# Patient Record
Sex: FEMALE
[De-identification: names, ages, dates, MRNs, and addresses within clinical notes are randomized per-mention and may not be internally consistent; named-entity substitution may affect disease eponyms.]

---

## 2020-08-20 ENCOUNTER — HOSPITAL ENCOUNTER (INPATIENT)
Dept: HOSPITAL 90 - EDH | Age: 55
LOS: 2 days | Discharge: HOME | DRG: 377 | End: 2020-08-22
Attending: INTERNAL MEDICINE | Admitting: INTERNAL MEDICINE
Payer: MEDICARE

## 2020-08-20 DIAGNOSIS — F41.9: ICD-10-CM

## 2020-08-20 DIAGNOSIS — N18.6: ICD-10-CM

## 2020-08-20 DIAGNOSIS — Z99.81: ICD-10-CM

## 2020-08-20 DIAGNOSIS — K92.2: Primary | ICD-10-CM

## 2020-08-20 DIAGNOSIS — I50.42: ICD-10-CM

## 2020-08-20 DIAGNOSIS — I95.89: ICD-10-CM

## 2020-08-20 DIAGNOSIS — Z99.2: ICD-10-CM

## 2020-08-20 DIAGNOSIS — E87.5: ICD-10-CM

## 2020-08-20 DIAGNOSIS — E87.1: ICD-10-CM

## 2020-08-20 DIAGNOSIS — J45.909: ICD-10-CM

## 2020-08-20 DIAGNOSIS — U07.1: ICD-10-CM

## 2020-08-20 DIAGNOSIS — D64.9: ICD-10-CM

## 2020-08-20 DIAGNOSIS — R53.81: ICD-10-CM

## 2020-08-20 LAB
ALBUMIN SERPL-MCNC: 1.8 G/DL (ref 3.5–5)
ALT SERPL-CCNC: 23 U/L (ref 12–78)
AST SERPL-CCNC: 55 U/L (ref 10–37)
BASOPHILS NFR BLD AUTO: 0.3 % (ref 0–5)
BASOPHILS NFR BLD AUTO: 0.3 % (ref 0–5)
BILIRUB SERPL-MCNC: 1.2 MG/DL (ref 0.2–1)
BNP SERPL-MCNC: 2430 PG/ML (ref 0–100)
BUN SERPL-MCNC: 67 MG/DL (ref 7–18)
CHLORIDE SERPL-SCNC: 90 MMOL/L (ref 101–111)
CO2 SERPL-SCNC: 23 MMOL/L (ref 21–32)
CREAT SERPL-MCNC: 5.3 MG/DL (ref 0.5–1.5)
EOSINOPHIL NFR BLD AUTO: 0.1 % (ref 0–8)
EOSINOPHIL NFR BLD AUTO: 0.2 % (ref 0–8)
ERYTHROCYTE [DISTWIDTH] IN BLOOD BY AUTOMATED COUNT: 21.9 % (ref 11–15.5)
GFR SERPL CREATININE-BSD FRML MDRD: 9 ML/MIN (ref 60–?)
GLUCOSE SERPL-MCNC: 239 MG/DL (ref 70–105)
HBA1C MFR BLD: 5.7 % (ref 4–6)
HCT VFR BLD AUTO: 15.7 % (ref 36–48)
HCT VFR BLD AUTO: 20.1 % (ref 36–48)
HCT VFR BLD AUTO: 23.3 % (ref 36–48)
LYMPHOCYTES NFR SPEC AUTO: 5.2 % (ref 21–51)
LYMPHOCYTES NFR SPEC AUTO: 5.6 % (ref 21–51)
MCH RBC QN AUTO: 33 PG (ref 27–33)
MCH RBC QN AUTO: 34.8 PG (ref 27–33)
MCHC RBC AUTO-ENTMCNC: 30.6 G/DL (ref 32–36)
MCHC RBC AUTO-ENTMCNC: 34.3 G/DL (ref 32–36)
MCV RBC AUTO: 113.8 FL (ref 79–99)
MCV RBC AUTO: 96.2 FL (ref 79–99)
MONOCYTES NFR BLD AUTO: 1.1 % (ref 3–13)
MONOCYTES NFR BLD AUTO: 3.8 % (ref 3–13)
NEUTROPHILS NFR BLD AUTO: 90 % (ref 40–77)
NEUTROPHILS NFR BLD AUTO: 92.6 % (ref 40–77)
NRBC BLD MANUAL-RTO: 0.3 % (ref 0–0.19)
NRBC BLD MANUAL-RTO: 0.7 % (ref 0–0.19)
PLATELET # BLD AUTO: 107 K/UL (ref 130–400)
PLATELET # BLD AUTO: 185 K/UL (ref 130–400)
POTASSIUM SERPL-SCNC: 6.8 MMOL/L (ref 3.5–5.1)
PROT SERPL-MCNC: 6 G/DL (ref 6–8.3)
RBC # BLD AUTO: 1.38 MIL/UL (ref 4–5.5)
RBC # BLD AUTO: 2.09 MIL/UL (ref 4–5.5)
RBC MORPH BLD: (no result)
SODIUM SERPL-SCNC: 129 MMOL/L (ref 136–145)
WBC # BLD AUTO: 6.1 K/UL (ref 4.8–10.8)
WBC # BLD AUTO: 8.8 K/UL (ref 4.8–10.8)

## 2020-08-20 PROCEDURE — 85014 HEMATOCRIT: CPT

## 2020-08-20 PROCEDURE — 36430 TRANSFUSION BLD/BLD COMPNT: CPT

## 2020-08-20 PROCEDURE — 85025 COMPLETE CBC W/AUTO DIFF WBC: CPT

## 2020-08-20 PROCEDURE — 93005 ELECTROCARDIOGRAM TRACING: CPT

## 2020-08-20 PROCEDURE — 86850 RBC ANTIBODY SCREEN: CPT

## 2020-08-20 PROCEDURE — 99291 CRITICAL CARE FIRST HOUR: CPT

## 2020-08-20 PROCEDURE — 80053 COMPREHEN METABOLIC PANEL: CPT

## 2020-08-20 PROCEDURE — 30233N1 TRANSFUSION OF NONAUTOLOGOUS RED BLOOD CELLS INTO PERIPHERAL VEIN, PERCUTANEOUS APPROACH: ICD-10-PCS | Performed by: INTERNAL MEDICINE

## 2020-08-20 PROCEDURE — 85027 COMPLETE CBC AUTOMATED: CPT

## 2020-08-20 PROCEDURE — 86901 BLOOD TYPING SEROLOGIC RH(D): CPT

## 2020-08-20 PROCEDURE — 83880 ASSAY OF NATRIURETIC PEPTIDE: CPT

## 2020-08-20 PROCEDURE — 82270 OCCULT BLOOD FECES: CPT

## 2020-08-20 PROCEDURE — 71045 X-RAY EXAM CHEST 1 VIEW: CPT

## 2020-08-20 PROCEDURE — 82948 REAGENT STRIP/BLOOD GLUCOSE: CPT

## 2020-08-20 PROCEDURE — 86923 COMPATIBILITY TEST ELECTRIC: CPT

## 2020-08-20 PROCEDURE — 36415 COLL VENOUS BLD VENIPUNCTURE: CPT

## 2020-08-20 PROCEDURE — 86900 BLOOD TYPING SEROLOGIC ABO: CPT

## 2020-08-20 PROCEDURE — 83036 HEMOGLOBIN GLYCOSYLATED A1C: CPT

## 2020-08-20 PROCEDURE — 85018 HEMOGLOBIN: CPT

## 2020-08-20 PROCEDURE — 5A1D70Z PERFORMANCE OF URINARY FILTRATION, INTERMITTENT, LESS THAN 6 HOURS PER DAY: ICD-10-PCS | Performed by: INTERNAL MEDICINE

## 2020-08-20 PROCEDURE — 87426 SARSCOV CORONAVIRUS AG IA: CPT

## 2020-08-20 PROCEDURE — 85610 PROTHROMBIN TIME: CPT

## 2020-08-20 PROCEDURE — 90935 HEMODIALYSIS ONE EVALUATION: CPT

## 2020-08-20 PROCEDURE — 80048 BASIC METABOLIC PNL TOTAL CA: CPT

## 2020-08-20 RX ADMIN — FAMOTIDINE SCH MG: 20 TABLET, FILM COATED ORAL at 21:10

## 2020-08-20 RX ADMIN — SODIUM CHLORIDE SCH UNIT: 9 INJECTION, SOLUTION INTRAVENOUS at 21:00

## 2020-08-20 RX ADMIN — SODIUM CHLORIDE SCH UNIT: 9 INJECTION, SOLUTION INTRAVENOUS at 16:30

## 2020-08-20 NOTE — NUR
Patient received from ED at start of shift, accompanied by staff to room 309.

Patient assisted assisted to bed and made comfortable.  Assessment and nursing

data base completed. No acute distress at this time.  Will continue to monitor.

## 2020-08-21 VITALS — DIASTOLIC BLOOD PRESSURE: 53 MMHG | SYSTOLIC BLOOD PRESSURE: 89 MMHG

## 2020-08-21 VITALS — SYSTOLIC BLOOD PRESSURE: 95 MMHG | DIASTOLIC BLOOD PRESSURE: 59 MMHG

## 2020-08-21 VITALS — DIASTOLIC BLOOD PRESSURE: 67 MMHG | SYSTOLIC BLOOD PRESSURE: 96 MMHG

## 2020-08-21 VITALS — DIASTOLIC BLOOD PRESSURE: 56 MMHG | SYSTOLIC BLOOD PRESSURE: 87 MMHG

## 2020-08-21 VITALS — DIASTOLIC BLOOD PRESSURE: 52 MMHG | SYSTOLIC BLOOD PRESSURE: 85 MMHG

## 2020-08-21 VITALS — SYSTOLIC BLOOD PRESSURE: 95 MMHG | DIASTOLIC BLOOD PRESSURE: 60 MMHG

## 2020-08-21 LAB
BUN SERPL-MCNC: 37 MG/DL (ref 7–18)
CHLORIDE SERPL-SCNC: 95 MMOL/L (ref 101–111)
CO2 SERPL-SCNC: 32 MMOL/L (ref 21–32)
CREAT SERPL-MCNC: 3.5 MG/DL (ref 0.5–1.5)
ERYTHROCYTE [DISTWIDTH] IN BLOOD BY AUTOMATED COUNT: 26 % (ref 11–15.5)
GFR SERPL CREATININE-BSD FRML MDRD: 14 ML/MIN (ref 60–?)
GLUCOSE SERPL-MCNC: 209 MG/DL (ref 70–105)
HCT VFR BLD AUTO: 22.4 % (ref 36–48)
HCT VFR BLD AUTO: 22.5 % (ref 36–48)
HCT VFR BLD AUTO: 22.6 % (ref 36–48)
HCT VFR BLD AUTO: 24.7 % (ref 36–48)
MCH RBC QN AUTO: 33.1 PG (ref 27–33)
MCHC RBC AUTO-ENTMCNC: 33.2 G/DL (ref 32–36)
MCV RBC AUTO: 99.6 FL (ref 79–99)
NRBC BLD MANUAL-RTO: 1.1 % (ref 0–0.19)
PLATELET # BLD AUTO: 132 K/UL (ref 130–400)
POTASSIUM SERPL-SCNC: 4 MMOL/L (ref 3.5–5.1)
RBC # BLD AUTO: 2.48 MIL/UL (ref 4–5.5)
SODIUM SERPL-SCNC: 136 MMOL/L (ref 136–145)
WBC # BLD AUTO: 8.4 K/UL (ref 4.8–10.8)

## 2020-08-21 PROCEDURE — 5A1D70Z PERFORMANCE OF URINARY FILTRATION, INTERMITTENT, LESS THAN 6 HOURS PER DAY: ICD-10-PCS | Performed by: INTERNAL MEDICINE

## 2020-08-21 RX ADMIN — SODIUM CHLORIDE SCH UNIT: 9 INJECTION, SOLUTION INTRAVENOUS at 07:30

## 2020-08-21 RX ADMIN — SODIUM CHLORIDE SCH UNIT: 9 INJECTION, SOLUTION INTRAVENOUS at 11:30

## 2020-08-21 RX ADMIN — SODIUM CHLORIDE SCH MG: 9 INJECTION, SOLUTION INTRAVENOUS at 21:00

## 2020-08-21 RX ADMIN — FAMOTIDINE SCH MG: 20 TABLET, FILM COATED ORAL at 21:55

## 2020-08-21 NOTE — NUR
CALL TO SPOUSE FOR DC PLANNING

 STATES PATIENT IS MOSTLY INDPENDETN BUT DOES USE A W/CHAIR IN THE HOMOE, HAS A PROVIDER 30 
HRS WEEKLY, HAS A SHOWER CHAIR,HOME SAFE AND ACCESSIBLE, HAS BEEN ON HD X 12 YEARS, [facility] 
MWF; [facility] TRANSPORTS. CM TO FOLLOW. DISCUSSED PATIENT HISTORY OF AMA FROM [facility]; STATES 
'WAS TREATED BADLY THERY, HERE YOU ARE TREATING HER WELL, BUT ITS HER HCOICE TO GO OR STAY" 



 WILD BUITRAGO DECLINED ENDOSCOPY LAST ADMIT 

CM TO FOLLOW 

-------------------------------------------------------------------------------

Addendum: 08/21/20 at 1714 by CHRIS WILSON RN CM

-------------------------------------------------------------------------------

Amended: Links added.

## 2020-08-22 VITALS — DIASTOLIC BLOOD PRESSURE: 50 MMHG | SYSTOLIC BLOOD PRESSURE: 98 MMHG

## 2020-08-22 VITALS — SYSTOLIC BLOOD PRESSURE: 96 MMHG | DIASTOLIC BLOOD PRESSURE: 54 MMHG

## 2020-08-22 LAB
BASOPHILS NFR BLD AUTO: 1.5 % (ref 0–5)
BUN SERPL-MCNC: 30 MG/DL (ref 7–18)
CHLORIDE SERPL-SCNC: 97 MMOL/L (ref 101–111)
CO2 SERPL-SCNC: 33 MMOL/L (ref 21–32)
CREAT SERPL-MCNC: 3.4 MG/DL (ref 0.5–1.5)
EOSINOPHIL NFR BLD AUTO: 4 % (ref 0–8)
ERYTHROCYTE [DISTWIDTH] IN BLOOD BY AUTOMATED COUNT: 25.5 % (ref 11–15.5)
GFR SERPL CREATININE-BSD FRML MDRD: 15 ML/MIN (ref 60–?)
GLUCOSE SERPL-MCNC: 155 MG/DL (ref 70–105)
HCT VFR BLD AUTO: 23.3 % (ref 36–48)
INR PPP: 1.14 (ref 0.85–1.15)
LYMPHOCYTES NFR SPEC AUTO: 12.6 % (ref 21–51)
MCH RBC QN AUTO: 32.8 PG (ref 27–33)
MCHC RBC AUTO-ENTMCNC: 32.2 G/DL (ref 32–36)
MCV RBC AUTO: 101.7 FL (ref 79–99)
MONOCYTES NFR BLD AUTO: 11.3 % (ref 3–13)
NEUTROPHILS NFR BLD AUTO: 70.3 % (ref 40–77)
NRBC BLD MANUAL-RTO: 0.7 % (ref 0–0.19)
PLATELET # BLD AUTO: 97 K/UL (ref 130–400)
POTASSIUM SERPL-SCNC: 3.3 MMOL/L (ref 3.5–5.1)
PROTHROMBIN TIME: 12.2 SEC (ref 9.6–11.6)
RBC # BLD AUTO: 2.29 MIL/UL (ref 4–5.5)
SODIUM SERPL-SCNC: 136 MMOL/L (ref 136–145)
WBC # BLD AUTO: 6 K/UL (ref 4.8–10.8)

## 2020-08-22 RX ADMIN — SODIUM CHLORIDE SCH MG: 9 INJECTION, SOLUTION INTRAVENOUS at 10:24

## 2020-08-22 NOTE — NUR
received report from am nurse, assumed care, shift assessment done, 24 cc done, timed 
medication given, safety maintained, pt appreciative care provided, refused gi consult, 
possible dc today.